# Patient Record
Sex: FEMALE | URBAN - METROPOLITAN AREA
[De-identification: names, ages, dates, MRNs, and addresses within clinical notes are randomized per-mention and may not be internally consistent; named-entity substitution may affect disease eponyms.]

---

## 2022-07-07 ENCOUNTER — OFFICE VISIT (OUTPATIENT)
Dept: URBAN - METROPOLITAN AREA CLINIC 74 | Facility: CLINIC | Age: 57
End: 2022-07-07

## 2022-08-25 ENCOUNTER — OFFICE VISIT (OUTPATIENT)
Dept: URBAN - METROPOLITAN AREA CLINIC 2 | Facility: CLINIC | Age: 57
End: 2022-08-25
Payer: COMMERCIAL

## 2022-08-25 ENCOUNTER — WEB ENCOUNTER (OUTPATIENT)
Dept: URBAN - METROPOLITAN AREA CLINIC 2 | Facility: CLINIC | Age: 57
End: 2022-08-25

## 2022-08-25 VITALS
HEIGHT: 61 IN | DIASTOLIC BLOOD PRESSURE: 79 MMHG | WEIGHT: 101.2 LBS | SYSTOLIC BLOOD PRESSURE: 108 MMHG | TEMPERATURE: 98.8 F | BODY MASS INDEX: 19.11 KG/M2

## 2022-08-25 DIAGNOSIS — D69.6 THROMBOCYTOPENIA: ICD-10-CM

## 2022-08-25 DIAGNOSIS — D68.9 COAGULOPATHY: ICD-10-CM

## 2022-08-25 DIAGNOSIS — K72.90 HEPATIC ENCEPHALOPATHY: ICD-10-CM

## 2022-08-25 DIAGNOSIS — K70.31 ALCOHOLIC CIRRHOSIS OF LIVER WITH ASCITES: ICD-10-CM

## 2022-08-25 PROBLEM — 420054005: Status: ACTIVE | Noted: 2022-08-25

## 2022-08-25 PROBLEM — 415116008: Status: ACTIVE | Noted: 2022-08-25

## 2022-08-25 PROBLEM — 64779008: Status: ACTIVE | Noted: 2022-08-25

## 2022-08-25 PROCEDURE — 99204 OFFICE O/P NEW MOD 45 MIN: CPT | Performed by: NURSE PRACTITIONER

## 2022-08-25 NOTE — HPI-TODAY'S VISIT:
The patient was referred by Dr. Ramesh Bee for alcoholic Cirrhosis .   A copy of this document is being forwarded to the referring provider. Very pleasant 57-year-old female with alcoholic cirrhosis seen today to establish care.  Patient reports several hospitalizations recently for hepatic encephalopathy.  She was recently discharged about 2 weeks ago.  Unsure what exacerbated the encephalopathy.  No infection was noted.  Patient reports compliance with medications including lactulose and Xifaxan.  Xifaxan was started earlier this year and she and  report 2-3 hospitalizations for HE.  They believe this was related to Xifaxan.  She is taking lactulose about 3 times daily and denies any altered mental status.   supports this.  Patient did have a EGD this year with no evidence of varices.  Patient has undergone evaluation for liver transplant through [facility] Dr. Thomson and follows with them every 6 months.  They report that [facility] has requested testing for alcohol levels, nicotine levels and also want her to see a dentist.  Patient reports no alcohol in over 20 years.  Imaging and labs reviewed from recent hospitalization.  Only minimal ascites and lower extremity edema noted.  Patient is on diuretics. Patient does have a history hepatitis C. previously treated and cured.

## 2022-10-06 ENCOUNTER — TELEPHONE ENCOUNTER (OUTPATIENT)
Dept: URBAN - METROPOLITAN AREA CLINIC 92 | Facility: CLINIC | Age: 57
End: 2022-10-06

## 2022-10-06 RX ORDER — LACTULOSE 10 G/15ML
15 ML SOLUTION ORAL
Qty: 1350 MILLILITER | Refills: 1

## 2022-10-25 ENCOUNTER — OUT OF OFFICE VISIT (OUTPATIENT)
Dept: URBAN - METROPOLITAN AREA MEDICAL CENTER 18 | Facility: MEDICAL CENTER | Age: 57
End: 2022-10-25
Payer: COMMERCIAL

## 2022-10-25 DIAGNOSIS — K56.690 OTHER PARTIAL INTESTINAL OBSTRUCTION: ICD-10-CM

## 2022-10-25 DIAGNOSIS — K70.30 ALC (ALCOHOLIC LIVER CIRRHOSIS): ICD-10-CM

## 2022-10-25 DIAGNOSIS — A41.89 OTHER SPECIFIED SEPSIS: ICD-10-CM

## 2022-10-25 DIAGNOSIS — K76.82 ENCEPHALOPATHY, HEPATIC: ICD-10-CM

## 2022-10-25 PROCEDURE — 99232 SBSQ HOSP IP/OBS MODERATE 35: CPT | Performed by: INTERNAL MEDICINE

## 2022-10-25 PROCEDURE — 99222 1ST HOSP IP/OBS MODERATE 55: CPT | Performed by: INTERNAL MEDICINE

## 2022-10-25 PROCEDURE — G8427 DOCREV CUR MEDS BY ELIG CLIN: HCPCS | Performed by: INTERNAL MEDICINE

## 2022-10-31 ENCOUNTER — OUT OF OFFICE VISIT (OUTPATIENT)
Dept: URBAN - METROPOLITAN AREA MEDICAL CENTER 18 | Facility: MEDICAL CENTER | Age: 57
End: 2022-10-31
Payer: COMMERCIAL

## 2022-10-31 DIAGNOSIS — K59.09 CHANGE IN BOWEL MOVEMENTS INTERMITTENT CONSTIPATION. URGENCY IN THE MORNING.: ICD-10-CM

## 2022-10-31 DIAGNOSIS — K74.69 CIRRHOSIS, CRYPTOGENIC: ICD-10-CM

## 2022-10-31 DIAGNOSIS — K56.690 OTHER PARTIAL INTESTINAL OBSTRUCTION: ICD-10-CM

## 2022-10-31 PROCEDURE — 99232 SBSQ HOSP IP/OBS MODERATE 35: CPT | Performed by: INTERNAL MEDICINE

## 2022-11-07 ENCOUNTER — TELEPHONE ENCOUNTER (OUTPATIENT)
Dept: URBAN - METROPOLITAN AREA CLINIC 2 | Facility: CLINIC | Age: 57
End: 2022-11-07

## 2022-11-07 RX ORDER — LACTULOSE 10 G/15ML
45 ML SOLUTION ORAL
Qty: 5400 MILLILITER | Refills: 0

## 2022-11-11 ENCOUNTER — TELEPHONE ENCOUNTER (OUTPATIENT)
Dept: URBAN - METROPOLITAN AREA CLINIC 2 | Facility: CLINIC | Age: 57
End: 2022-11-11

## 2022-11-18 ENCOUNTER — OUT OF OFFICE VISIT (OUTPATIENT)
Dept: URBAN - METROPOLITAN AREA MEDICAL CENTER 18 | Facility: MEDICAL CENTER | Age: 57
End: 2022-11-18
Payer: COMMERCIAL

## 2022-11-18 DIAGNOSIS — K74.69 CIRRHOSIS, CRYPTOGENIC: ICD-10-CM

## 2022-11-18 DIAGNOSIS — R18.8 ABDOMINAL ASCITES: ICD-10-CM

## 2022-11-18 DIAGNOSIS — K76.82 ENCEPHALOPATHY, HEPATIC: ICD-10-CM

## 2022-11-18 PROCEDURE — 99222 1ST HOSP IP/OBS MODERATE 55: CPT | Performed by: PHYSICIAN ASSISTANT

## 2022-11-18 PROCEDURE — G8427 DOCREV CUR MEDS BY ELIG CLIN: HCPCS | Performed by: PHYSICIAN ASSISTANT

## 2022-11-19 ENCOUNTER — OUT OF OFFICE VISIT (OUTPATIENT)
Dept: URBAN - METROPOLITAN AREA MEDICAL CENTER 18 | Facility: MEDICAL CENTER | Age: 57
End: 2022-11-19
Payer: COMMERCIAL

## 2022-11-19 DIAGNOSIS — K76.82 ENCEPHALOPATHY, HEPATIC: ICD-10-CM

## 2022-11-19 DIAGNOSIS — K74.69 CIRRHOSIS, CRYPTOGENIC: ICD-10-CM

## 2022-11-19 PROCEDURE — 99232 SBSQ HOSP IP/OBS MODERATE 35: CPT | Performed by: INTERNAL MEDICINE

## 2022-11-21 ENCOUNTER — OUT OF OFFICE VISIT (OUTPATIENT)
Dept: URBAN - METROPOLITAN AREA MEDICAL CENTER 18 | Facility: MEDICAL CENTER | Age: 57
End: 2022-11-21
Payer: COMMERCIAL

## 2022-11-21 DIAGNOSIS — R13.19 CERVICAL DYSPHAGIA: ICD-10-CM

## 2022-11-21 PROCEDURE — 99231 SBSQ HOSP IP/OBS SF/LOW 25: CPT | Performed by: INTERNAL MEDICINE

## 2022-11-23 ENCOUNTER — DASHBOARD ENCOUNTERS (OUTPATIENT)
Age: 57
End: 2022-11-23

## 2022-11-28 ENCOUNTER — OFFICE VISIT (OUTPATIENT)
Dept: URBAN - METROPOLITAN AREA CLINIC 2 | Facility: CLINIC | Age: 57
End: 2022-11-28

## 2022-11-28 VITALS — HEIGHT: 61 IN

## 2022-11-28 RX ORDER — LACTULOSE 10 G/15ML
45 ML SOLUTION ORAL
Qty: 5400 MILLILITER | Refills: 0 | Status: ACTIVE | COMMUNITY